# Patient Record
Sex: FEMALE | Race: WHITE | URBAN - METROPOLITAN AREA
[De-identification: names, ages, dates, MRNs, and addresses within clinical notes are randomized per-mention and may not be internally consistent; named-entity substitution may affect disease eponyms.]

---

## 2017-02-06 ENCOUNTER — OFFICE VISIT (OUTPATIENT)
Dept: RHEUMATOLOGY | Facility: CLINIC | Age: 62
End: 2017-02-06

## 2017-02-06 VITALS
WEIGHT: 162 LBS | DIASTOLIC BLOOD PRESSURE: 78 MMHG | SYSTOLIC BLOOD PRESSURE: 122 MMHG | HEART RATE: 64 BPM | BODY MASS INDEX: 28 KG/M2 | RESPIRATION RATE: 16 BRPM

## 2017-02-06 DIAGNOSIS — M79.7 FIBROMYALGIA: Primary | ICD-10-CM

## 2017-02-06 PROCEDURE — 99213 OFFICE O/P EST LOW 20 MIN: CPT | Performed by: INTERNAL MEDICINE

## 2017-02-06 RX ORDER — AMITRIPTYLINE HYDROCHLORIDE 25 MG/1
TABLET, FILM COATED ORAL
Qty: 180 TABLET | Refills: 1 | Status: SHIPPED | OUTPATIENT
Start: 2017-02-06 | End: 2017-10-13

## 2017-02-06 RX ORDER — METOPROLOL SUCCINATE 50 MG/1
100 TABLET, EXTENDED RELEASE ORAL DAILY
Refills: 5 | COMMUNITY
Start: 2017-01-20

## 2017-02-06 NOTE — PROGRESS NOTES
[facility]  Dr. Candida Lucas Progress Note     Subjective:   Leah Zamudio is a(n) 62year old female. Current complaints: Patient presents with:  Fibromyalgia Syndrome: 6 month f/u.  Pt states 'is doing good, no complaints.'   Refill Request: elavil

## 2017-05-30 ENCOUNTER — HOSPITAL ENCOUNTER (OUTPATIENT)
Dept: CT IMAGING | Facility: HOSPITAL | Age: 62
Discharge: HOME | End: 2017-05-30
Attending: INTERNAL MEDICINE

## 2017-05-30 ENCOUNTER — HOSPITAL ENCOUNTER (OUTPATIENT)
Dept: CARDIOLOGY CLINIC | Facility: HOSPITAL | Age: 62
Discharge: HOME | End: 2017-05-30
Attending: INTERNAL MEDICINE

## 2017-05-30 DIAGNOSIS — Z13.6 SCREENING FOR HEART DISEASE: ICD-10-CM

## 2017-10-13 ENCOUNTER — OFFICE VISIT (OUTPATIENT)
Dept: RHEUMATOLOGY | Facility: CLINIC | Age: 62
End: 2017-10-13

## 2017-10-13 VITALS
SYSTOLIC BLOOD PRESSURE: 118 MMHG | BODY MASS INDEX: 29.02 KG/M2 | HEART RATE: 62 BPM | WEIGHT: 170 LBS | RESPIRATION RATE: 18 BRPM | DIASTOLIC BLOOD PRESSURE: 82 MMHG | TEMPERATURE: 98 F | HEIGHT: 64 IN

## 2017-10-13 DIAGNOSIS — M79.7 FIBROMYALGIA: Primary | ICD-10-CM

## 2017-10-13 PROCEDURE — 99213 OFFICE O/P EST LOW 20 MIN: CPT | Performed by: INTERNAL MEDICINE

## 2017-10-13 RX ORDER — AMITRIPTYLINE HYDROCHLORIDE 25 MG/1
TABLET, FILM COATED ORAL
Qty: 180 TABLET | Refills: 1 | Status: SHIPPED | OUTPATIENT
Start: 2017-10-13 | End: 2018-04-13

## 2017-10-13 RX ORDER — BLOOD PRESSURE TEST KIT-MEDIUM
1 KIT MISCELLANEOUS
COMMUNITY
Start: 2016-09-24

## 2017-10-13 NOTE — PATIENT INSTRUCTIONS
Current instructions are to continue use of Elavil known his amitriptyline generically 5 mg tablets 2 at night to help sleep and help with fibromyalgia pain. Continue exercise regularly. Use ibuprofen over-the-counter as needed for aches and pains.   Erik Smith

## 2017-10-13 NOTE — PROGRESS NOTES
[facility]  Dr. Zheng Munguia Progress Note     Subjective:   Alee Soria is a(n) 65year old female.    Current complaints: Patient presents with:  Joint Pain: pt is here for a 6 month f/up- pt c/o no pain today, states current meds are working   Sarenza

## 2018-04-13 ENCOUNTER — OFFICE VISIT (OUTPATIENT)
Dept: RHEUMATOLOGY | Facility: CLINIC | Age: 63
End: 2018-04-13

## 2018-04-13 VITALS
HEART RATE: 68 BPM | RESPIRATION RATE: 16 BRPM | SYSTOLIC BLOOD PRESSURE: 112 MMHG | BODY MASS INDEX: 31 KG/M2 | DIASTOLIC BLOOD PRESSURE: 68 MMHG | WEIGHT: 179 LBS

## 2018-04-13 DIAGNOSIS — M79.7 FIBROMYALGIA: Primary | ICD-10-CM

## 2018-04-13 PROCEDURE — 99213 OFFICE O/P EST LOW 20 MIN: CPT | Performed by: INTERNAL MEDICINE

## 2018-04-13 RX ORDER — AMITRIPTYLINE HYDROCHLORIDE 25 MG/1
TABLET, FILM COATED ORAL
Qty: 180 TABLET | Refills: 3 | Status: SHIPPED | OUTPATIENT
Start: 2018-04-13 | End: 2019-04-12

## 2018-04-13 NOTE — PROGRESS NOTES
[facility]  Dr. Que Jaramillo Progress Note     Subjective:   Duane Goldman is a(n) 65year old female. Current complaints: Patient presents with:  Fibromyalgia Syndrome: 6 month f/u. Pt states 'is doing fine, no new issues.'  Feeling good.    Aches and

## 2018-04-13 NOTE — PATIENT INSTRUCTIONS
Current advice is to continue on Elavil also known as amitriptyline 25 mg 2 tablets at night to help you sleep and help with the fibromyalgia pain. However you need to get more regular exercise that also combats fibromyalgia pain.   You can take extra stre

## 2018-09-06 ENCOUNTER — CHARTING TRANS (OUTPATIENT)
Dept: OTHER | Age: 63
End: 2018-09-06

## 2018-11-27 VITALS
WEIGHT: 180 LBS | TEMPERATURE: 98.6 F | HEIGHT: 64 IN | HEART RATE: 88 BPM | BODY MASS INDEX: 30.73 KG/M2 | OXYGEN SATURATION: 98 % | RESPIRATION RATE: 16 BRPM

## 2019-04-12 ENCOUNTER — OFFICE VISIT (OUTPATIENT)
Dept: RHEUMATOLOGY | Facility: CLINIC | Age: 64
End: 2019-04-12
Payer: COMMERCIAL

## 2019-04-12 VITALS
BODY MASS INDEX: 29.02 KG/M2 | HEIGHT: 64 IN | DIASTOLIC BLOOD PRESSURE: 82 MMHG | RESPIRATION RATE: 18 BRPM | SYSTOLIC BLOOD PRESSURE: 136 MMHG | WEIGHT: 170 LBS | HEART RATE: 68 BPM

## 2019-04-12 DIAGNOSIS — C91.10 CLL (CHRONIC LYMPHOCYTIC LEUKEMIA) (HCC): ICD-10-CM

## 2019-04-12 DIAGNOSIS — M76.62 TENDONITIS, ACHILLES, LEFT: ICD-10-CM

## 2019-04-12 DIAGNOSIS — M79.7 FIBROMYALGIA: Primary | ICD-10-CM

## 2019-04-12 PROCEDURE — 99213 OFFICE O/P EST LOW 20 MIN: CPT | Performed by: INTERNAL MEDICINE

## 2019-04-12 RX ORDER — AMITRIPTYLINE HYDROCHLORIDE 25 MG/1
TABLET, FILM COATED ORAL
Qty: 180 TABLET | Refills: 3 | Status: SHIPPED | OUTPATIENT
Start: 2019-04-12 | End: 2019-10-18

## 2019-04-12 RX ORDER — NAPROXEN 500 MG/1
500 TABLET ORAL 2 TIMES DAILY PRN
Qty: 180 TABLET | Refills: 1 | Status: SHIPPED | OUTPATIENT
Start: 2019-04-12 | End: 2020-10-21

## 2019-04-12 NOTE — PATIENT INSTRUCTIONS
Current plan is to use Naproxen as needed for pain one or two per day. Use Elavil 25 mg 2 at bedtime for sleep. Obtain some exercise. If needed see Dr Zohra Loco of [facility], [address] [place].   Endocrinology  Dr Isaiah bunch

## 2019-04-12 NOTE — PROGRESS NOTES
[facility]  Dr. Melissa Qualia Progress Note     Subjective:   Armin Dutta is a(n) 59year old female.    Current complaints: Patient presents with:  Fibromyalgia Syndrome: 1 YEAR F/U, Recent dx of CLL, Fibro under control, also recent achillis injury  Tye Mishra

## 2019-10-18 ENCOUNTER — OFFICE VISIT (OUTPATIENT)
Dept: RHEUMATOLOGY | Facility: CLINIC | Age: 64
End: 2019-10-18
Payer: COMMERCIAL

## 2019-10-18 VITALS
HEART RATE: 78 BPM | DIASTOLIC BLOOD PRESSURE: 94 MMHG | HEIGHT: 64 IN | SYSTOLIC BLOOD PRESSURE: 140 MMHG | BODY MASS INDEX: 31.92 KG/M2 | RESPIRATION RATE: 16 BRPM | WEIGHT: 187 LBS

## 2019-10-18 DIAGNOSIS — M79.7 FIBROMYALGIA: Primary | ICD-10-CM

## 2019-10-18 DIAGNOSIS — C91.10 CLL (CHRONIC LYMPHOCYTIC LEUKEMIA) (HCC): ICD-10-CM

## 2019-10-18 PROCEDURE — 99213 OFFICE O/P EST LOW 20 MIN: CPT | Performed by: INTERNAL MEDICINE

## 2019-10-18 RX ORDER — NAPROXEN 500 MG/1
500 TABLET ORAL 2 TIMES DAILY PRN
Qty: 180 TABLET | Refills: 1 | Status: CANCELLED | OUTPATIENT
Start: 2019-10-18

## 2019-10-18 RX ORDER — AMITRIPTYLINE HYDROCHLORIDE 25 MG/1
TABLET, FILM COATED ORAL
Qty: 180 TABLET | Refills: 3 | Status: SHIPPED | OUTPATIENT
Start: 2019-10-18 | End: 2020-10-21

## 2019-10-18 NOTE — PROGRESS NOTES
[facility]  Dr. Kristan Sterling Progress Note     Subjective:   Johanny Hall is a(n) 62year old female. Current complaints: Patient presents with:  Fibromyalgia Syndrome: est pt-fu 6mo-Pt states she is feeling prety good.  Dx with CLL-WBC have stayed the

## 2019-10-18 NOTE — PATIENT INSTRUCTIONS
Current plan is to stay on amitriptyline also known as Elavil 25 mg, 2 tablets at bedtime to help with sleep and it will also help with aches and pains of fibromyalgia. During the week if there is pain take naproxen as needed 1 or 2 a day.   Continue to ex

## 2020-10-21 ENCOUNTER — OFFICE VISIT (OUTPATIENT)
Dept: RHEUMATOLOGY | Facility: CLINIC | Age: 65
End: 2020-10-21
Payer: COMMERCIAL

## 2020-10-21 VITALS
WEIGHT: 196 LBS | BODY MASS INDEX: 34.3 KG/M2 | HEART RATE: 67 BPM | OXYGEN SATURATION: 99 % | RESPIRATION RATE: 16 BRPM | TEMPERATURE: 97 F | DIASTOLIC BLOOD PRESSURE: 82 MMHG | HEIGHT: 63.5 IN | SYSTOLIC BLOOD PRESSURE: 120 MMHG

## 2020-10-21 DIAGNOSIS — C91.10 CLL (CHRONIC LYMPHOCYTIC LEUKEMIA) (HCC): ICD-10-CM

## 2020-10-21 DIAGNOSIS — M79.7 FIBROMYALGIA: Primary | ICD-10-CM

## 2020-10-21 PROCEDURE — 3074F SYST BP LT 130 MM HG: CPT | Performed by: INTERNAL MEDICINE

## 2020-10-21 PROCEDURE — 99213 OFFICE O/P EST LOW 20 MIN: CPT | Performed by: INTERNAL MEDICINE

## 2020-10-21 PROCEDURE — 3079F DIAST BP 80-89 MM HG: CPT | Performed by: INTERNAL MEDICINE

## 2020-10-21 PROCEDURE — 3008F BODY MASS INDEX DOCD: CPT | Performed by: INTERNAL MEDICINE

## 2020-10-21 RX ORDER — NAPROXEN 500 MG/1
500 TABLET ORAL 2 TIMES DAILY PRN
Qty: 180 TABLET | Refills: 3 | Status: SHIPPED | OUTPATIENT
Start: 2020-10-21 | End: 2021-10-20

## 2020-10-21 RX ORDER — AMITRIPTYLINE HYDROCHLORIDE 25 MG/1
TABLET, FILM COATED ORAL
Qty: 180 TABLET | Refills: 3 | Status: SHIPPED | OUTPATIENT
Start: 2020-10-21

## 2020-10-21 NOTE — PROGRESS NOTES
[facility]  Dr. Mason Em Progress Note     Subjective:   Shaista Coates is a(n) 60year old female. Current complaints: Patient presents with:  Fibromyalgia: 1 year follow-up, doing \"ok. \"  Still taking naproxen for aches and pains.   Patient denies

## 2020-10-21 NOTE — PATIENT INSTRUCTIONS
Continue to use amitriptyline 25 mg, 2 tablets at bedtime to help with sleep and fibromyalgia. Use naproxen 500 mg once or twice a day for breakthrough pain of the muscles and joints.   Exercise regularly by walking, stretching and just staying active for

## 2021-10-20 ENCOUNTER — OFFICE VISIT (OUTPATIENT)
Dept: RHEUMATOLOGY | Facility: CLINIC | Age: 66
End: 2021-10-20
Payer: MEDICARE

## 2021-10-20 VITALS
OXYGEN SATURATION: 99 % | WEIGHT: 198 LBS | HEIGHT: 64 IN | BODY MASS INDEX: 33.8 KG/M2 | TEMPERATURE: 97 F | HEART RATE: 69 BPM | DIASTOLIC BLOOD PRESSURE: 88 MMHG | SYSTOLIC BLOOD PRESSURE: 122 MMHG

## 2021-10-20 DIAGNOSIS — M79.7 FIBROMYALGIA: Primary | ICD-10-CM

## 2021-10-20 DIAGNOSIS — C91.10 CLL (CHRONIC LYMPHOCYTIC LEUKEMIA) (HCC): ICD-10-CM

## 2021-10-20 DIAGNOSIS — M19.071 OSTEOARTHRITIS OF MIDTARSAL JOINT OF RIGHT FOOT: ICD-10-CM

## 2021-10-20 PROCEDURE — 99214 OFFICE O/P EST MOD 30 MIN: CPT | Performed by: INTERNAL MEDICINE

## 2021-10-20 RX ORDER — CHOLECALCIFEROL (VITAMIN D3) 50 MCG
TABLET ORAL
COMMUNITY

## 2021-10-20 RX ORDER — GABAPENTIN 300 MG/1
300 CAPSULE ORAL 2 TIMES DAILY
Qty: 60 CAPSULE | Refills: 11 | Status: SHIPPED | OUTPATIENT
Start: 2021-10-20

## 2021-10-20 RX ORDER — NAPROXEN 500 MG/1
500 TABLET ORAL 2 TIMES DAILY PRN
Qty: 180 TABLET | Refills: 3 | Status: SHIPPED | OUTPATIENT
Start: 2021-10-20

## 2021-10-20 NOTE — PROGRESS NOTES
[facility]  Dr. Mason Em Progress Note     Subjective:   Shaista Coates is a(n) 73year old female. Current complaints: Patient presents with:   Follow - Up: LOV 10-; annual f/u; moved in June 2021 \"body has not recovered\" from the move, jocelin at night. Sometimes it also causes a dry mouth. Try the Neurontin if it does not agree with you call me. Continue to stay active. Eat a well-balanced diet, apply heat to areas it gets sore and then rub and some Aspercreme or BenGay.   We will be explori

## 2021-10-20 NOTE — PATIENT INSTRUCTIONS
For fibromyalgia and arthritis pain, continue take naproxen 500 mg twice a day. Occasionally could take 3 or even 4 but not on a daily basis. Too much naproxen can cause a hole in your stomach or lead to ulcers. 2 a day generally the maximum.   For mild

## 2022-07-08 RX ORDER — AMITRIPTYLINE HYDROCHLORIDE 25 MG/1
TABLET, FILM COATED ORAL
Qty: 180 TABLET | Refills: 3 | OUTPATIENT
Start: 2022-07-08

## 2022-07-08 RX ORDER — AMITRIPTYLINE HYDROCHLORIDE 25 MG/1
TABLET, FILM COATED ORAL
Qty: 180 TABLET | Refills: 3 | Status: SHIPPED | OUTPATIENT
Start: 2022-07-08

## 2022-07-08 NOTE — TELEPHONE ENCOUNTER
Pt called and said that she does still take the amitriptyline and doesn't take the gabapentin that was supposed to replace it.      Refill sent to pharmacy

## 2022-10-19 ENCOUNTER — OFFICE VISIT (OUTPATIENT)
Dept: RHEUMATOLOGY | Facility: CLINIC | Age: 67
End: 2022-10-19
Payer: MEDICARE

## 2022-10-19 VITALS
SYSTOLIC BLOOD PRESSURE: 158 MMHG | DIASTOLIC BLOOD PRESSURE: 90 MMHG | BODY MASS INDEX: 33.29 KG/M2 | TEMPERATURE: 97 F | HEIGHT: 64 IN | HEART RATE: 79 BPM | OXYGEN SATURATION: 97 % | WEIGHT: 195 LBS

## 2022-10-19 DIAGNOSIS — M79.7 FIBROMYALGIA: Primary | ICD-10-CM

## 2022-10-19 DIAGNOSIS — C91.10 CLL (CHRONIC LYMPHOCYTIC LEUKEMIA) (HCC): ICD-10-CM

## 2022-10-19 PROCEDURE — 99214 OFFICE O/P EST MOD 30 MIN: CPT | Performed by: INTERNAL MEDICINE

## 2022-10-19 RX ORDER — NAPROXEN 500 MG/1
500 TABLET ORAL 2 TIMES DAILY PRN
Qty: 180 TABLET | Refills: 3 | Status: CANCELLED
Start: 2022-10-19

## 2022-10-19 RX ORDER — AMITRIPTYLINE HYDROCHLORIDE 25 MG/1
TABLET, FILM COATED ORAL
Qty: 180 TABLET | Refills: 3 | Status: SHIPPED | OUTPATIENT
Start: 2022-10-19

## 2022-10-19 RX ORDER — GABAPENTIN 300 MG/1
300 CAPSULE ORAL 2 TIMES DAILY
Qty: 60 CAPSULE | Refills: 11 | Status: CANCELLED
Start: 2022-10-19

## 2022-10-19 RX ORDER — LOSARTAN POTASSIUM 50 MG/1
50 TABLET ORAL DAILY
Qty: 90 TABLET | Refills: 1 | Status: CANCELLED
Start: 2022-10-19

## 2022-10-19 NOTE — PATIENT INSTRUCTIONS
Fibromyalgia is stable. Continue Elavil 25-50 mg at bedtime as needed. Exercise regularly. Well balanced diet. 8 hours of sleep a night. Return to office for recheck 1 year.

## 2023-10-18 RX ORDER — AMITRIPTYLINE HYDROCHLORIDE 25 MG/1
TABLET, FILM COATED ORAL
Qty: 180 TABLET | Refills: 3 | Status: SHIPPED | OUTPATIENT
Start: 2023-10-18

## 2023-11-13 ENCOUNTER — OFFICE VISIT (OUTPATIENT)
Dept: RHEUMATOLOGY | Facility: CLINIC | Age: 68
End: 2023-11-13
Payer: MEDICARE

## 2023-11-13 VITALS
TEMPERATURE: 98 F | BODY MASS INDEX: 34.31 KG/M2 | HEIGHT: 64 IN | WEIGHT: 201 LBS | HEART RATE: 71 BPM | OXYGEN SATURATION: 96 % | SYSTOLIC BLOOD PRESSURE: 146 MMHG | DIASTOLIC BLOOD PRESSURE: 90 MMHG | RESPIRATION RATE: 16 BRPM

## 2023-11-13 DIAGNOSIS — M79.7 FIBROMYALGIA: Primary | ICD-10-CM

## 2023-11-13 DIAGNOSIS — M77.31 HEEL SPUR, RIGHT: ICD-10-CM

## 2023-11-13 DIAGNOSIS — C91.10 CLL (CHRONIC LYMPHOCYTIC LEUKEMIA) (HCC): ICD-10-CM

## 2023-11-13 PROCEDURE — 99214 OFFICE O/P EST MOD 30 MIN: CPT | Performed by: INTERNAL MEDICINE

## 2023-11-13 RX ORDER — ROSUVASTATIN CALCIUM 10 MG/1
10 TABLET, COATED ORAL DAILY
COMMUNITY
Start: 2021-12-20

## 2023-11-13 RX ORDER — AMITRIPTYLINE HYDROCHLORIDE 25 MG/1
25 TABLET, FILM COATED ORAL NIGHTLY
Qty: 90 TABLET | Refills: 3 | Status: SHIPPED | OUTPATIENT
Start: 2023-11-13

## 2023-11-13 NOTE — PATIENT INSTRUCTIONS
Elavil 25 mg at night - continue nightly. Occasional ES Tylenol 500 mg as needed for pain. Exercise regularly - walk for exercise. CLL checkups with [facility]. RTO 1 year.

## 2024-10-14 ENCOUNTER — OFFICE VISIT (OUTPATIENT)
Dept: RHEUMATOLOGY | Facility: CLINIC | Age: 69
End: 2024-10-14
Payer: MEDICARE

## 2024-10-14 VITALS
DIASTOLIC BLOOD PRESSURE: 70 MMHG | RESPIRATION RATE: 16 BRPM | OXYGEN SATURATION: 95 % | SYSTOLIC BLOOD PRESSURE: 122 MMHG | HEIGHT: 64 IN | BODY MASS INDEX: 29.88 KG/M2 | TEMPERATURE: 97 F | WEIGHT: 175 LBS | HEART RATE: 60 BPM

## 2024-10-14 DIAGNOSIS — M77.31 HEEL SPUR, RIGHT: ICD-10-CM

## 2024-10-14 DIAGNOSIS — M79.7 FIBROMYALGIA: Primary | ICD-10-CM

## 2024-10-14 DIAGNOSIS — C91.10 CLL (CHRONIC LYMPHOCYTIC LEUKEMIA) (HCC): ICD-10-CM

## 2024-10-14 PROCEDURE — 99214 OFFICE O/P EST MOD 30 MIN: CPT | Performed by: INTERNAL MEDICINE

## 2024-10-14 NOTE — PROGRESS NOTES
[facility]  Dr. Hernandes Progress Note     Subjective:   Sandra Houston is a(n) 69 year old female.   Current complaints: History of fibromyalgia.  1 year follow-up.  Overall feeling fine.  Has painful heel spurs.  [facility] the [place] [facility]. .  Also history of CLL.  On amitriptyline.  25 mg at night.  CLL checkups at [facility].  Feeling ok.  Planning on heel spur surgery.  Seeing an orthopedic surgeon in [place].    Has gabapentin for pain.  Occasional use of gabapentin and naproxen.  Rides a bike.  Objective:   /70   Pulse 60   Temp 97.3 °F (36.3 °C)   Resp 16   Ht 5' 4\" (1.626 m)   Wt 175 lb (79.4 kg)   SpO2 95%   BMI 30.04 kg/m²   Lungs clear Heart nsr  No leg edema.   7/31.24  Hb A1C - 5.5.    9/12/24  wbc 12.2  hb 13.5  hematocrit  40.6  Plats 222,[address] [facility]  .  Assessment:     Encounter Diagnoses   Name Primary?    Fibromyalgia Yes    Heel spur, right     CLL (chronic lymphocytic leukemia) (HCC)    Fibromyalgia stable.  Bilateal heel spurs bothersome.  CLL present.    Plan:     Patient Instructions   Amitriptyline 25 mg - one or two at night for Fibromyalgia.  Exercise by riding your bike and walking.  Good luck with upcoming heel spur surgery in [place].  Use gabapentin at times for pain relief.  Also occasional Naproxen for arthritis pain.  CLL monitoring at [facility].   Return to office one year.          Jesus Hernandes MD 10/14/2024 10:21 AM

## 2024-10-14 NOTE — PATIENT INSTRUCTIONS
Amitriptyline 25 mg - one or two at night for Fibromyalgia.  Exercise by riding your bike and walking.  Good luck with upcoming heel spur surgery in [place].  Use gabapentin at times for pain relief.  Also occasional Naproxen for arthritis pain.  CLL monitoring at [facility].   Return to office one year.

## (undated) NOTE — MR AVS SNAPSHOT
[facility]  [address], [address]  [place] [phone]               Thank you for choosing us for your health care visit with Katelyn Ordonez MD.  We are glad to serve you and happy to provide you with this These medications were sent to [facility] [address], [address] [facility] [place]) & [address], [phone], [address] [facility], [address] [address]     Phone:  [phone]    - Amitriptyline HCl 25 MG T